# Patient Record
Sex: MALE | Race: BLACK OR AFRICAN AMERICAN | Employment: STUDENT | URBAN - METROPOLITAN AREA
[De-identification: names, ages, dates, MRNs, and addresses within clinical notes are randomized per-mention and may not be internally consistent; named-entity substitution may affect disease eponyms.]

---
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
## 2024-06-14 NOTE — ED INITIAL ASSESSMENT (HPI)
Head congestion, post nasal drip, productive cough.  Felt like he had fever & chills.  Woke w eyes crusted shut w discharge.  Also rash on & off.  Onset 72 hours.

## 2024-06-14 NOTE — ED PROVIDER NOTES
Patient Seen in: [facility]      History     Chief Complaint   Patient presents with    Eye Visual Problem     Stated Complaint: red eye    Subjective:   HPI  Paul Thomas is a 18 year old male presents with acute onset of URI symptoms x 2 days. Patient reports  bilateral eye redness/ irritation, sinus congestion, non productive cough, rhinorrhea.  Patient denies dysphagia, throat pain, ear pain,  fevers, chills, shortness of breath, respiratory distress, stridor, neck pain/ stiffness, headache, eye pain,facial/ lip/ eyelid swelling. No medications taken prior to arrival. No alleviating/ aggravating factors. Patient is  concerned about COVID 19 infection at this encounter.  Patient is  immunized for COVID 19.             Objective:   History reviewed. No pertinent past medical history.           History reviewed. No pertinent surgical history.             Social History     Socioeconomic History    Marital status: Single   Tobacco Use    Smoking status: Never     Passive exposure: Never    Smokeless tobacco: Never   Vaping Use    Vaping status: Never Used   Substance and Sexual Activity    Alcohol use: Never    Drug use: Never     Social Determinants of Health      Received from [facility]    Housing Stability              Review of Systems   All other systems reviewed and are negative.      Positive for stated complaint: red eye  Other systems are as noted in HPI.  Constitutional and vital signs reviewed.      All other systems reviewed and negative except as noted above.    Physical Exam     ED Triage Vitals [06/14/24 0814]   /66   Pulse 75   Resp 18   Temp 99 °F (37.2 °C)   Temp src Temporal   SpO2    O2 Device        Current Vitals:   Vital Signs  BP: 119/66  Pulse: 75  Resp: 18  Temp: 99 °F (37.2 °C)  Temp src: Temporal            Physical Exam  Vitals and nursing note reviewed.   Constitutional:       General: He is not in acute distress.     Appearance:  Normal appearance. He is normal weight. He is not ill-appearing, toxic-appearing or diaphoretic.   HENT:      Head: Normocephalic and atraumatic.      Right Ear: Tympanic membrane and ear canal normal.      Left Ear: Tympanic membrane and ear canal normal.      Nose: Congestion present. No rhinorrhea.      Mouth/Throat:      Mouth: Mucous membranes are moist.      Pharynx: Oropharynx is clear. No oropharyngeal exudate or posterior oropharyngeal erythema.   Eyes:      General:         Right eye: No discharge.         Left eye: No discharge.      Extraocular Movements: Extraocular movements intact.      Pupils: Pupils are equal, round, and reactive to light.      Comments: Bilateral conjunctival injection      Cardiovascular:      Rate and Rhythm: Normal rate.      Pulses: Normal pulses.   Pulmonary:      Effort: Pulmonary effort is normal. No respiratory distress.      Breath sounds: Normal breath sounds. No stridor. No wheezing, rhonchi or rales.   Chest:      Chest wall: No tenderness.   Musculoskeletal:         General: No swelling, tenderness, deformity or signs of injury. Normal range of motion.      Cervical back: Normal range of motion and neck supple.      Right lower leg: No edema.      Left lower leg: No edema.   Skin:     General: Skin is warm and dry.      Capillary Refill: Capillary refill takes less than 2 seconds.      Coloration: Skin is not jaundiced or pale.      Findings: No bruising, erythema, lesion or rash.   Neurological:      General: No focal deficit present.      Mental Status: He is alert and oriented to person, place, and time. Mental status is at baseline.   Psychiatric:         Mood and Affect: Mood normal.         Behavior: Behavior normal.         Thought Content: Thought content normal.         Judgment: Judgment normal.               ED Course   Labs Reviewed - No data to display                   MDM                                        Medical Decision Making  18 year old well  appearing male presents with acute onset of rhinorrhea, nasal congestion, and bilateral eye redness that started less than 2 days prior to arrival.  Considerations to include but not limited to bacterial conjunctivitis vs allergic/ viral conjunctivitis vs corneal abrasion vs iritis vs foreign body     Plan  - No acute interventions at this encounter   - DC to home   - rx: Flonase 2 sprays to bilateral nostrils BID. Zyrtec po daily.  ofloxacin eye drops to bilateral eyes TID (to be started if symptoms do not improve or worsen within the next 24-48 hours).   - refer to PCP for further evaluation    - return to ED         Disposition and Plan     Clinical Impression:  1. Allergic conjunctivitis of both eyes         Disposition:  Discharge  6/14/2024  8:24 am    Follow-up:  [facility] [place]  [address] [id number]        [address] [address] [phone]              Medications Prescribed:  Discharge Medication List as of 6/14/2024  8:26 AM        START taking these medications    Details   cetirizine 10 MG Oral Tab Take 1 tablet (10 mg total) by mouth daily., Normal, Disp-30 tablet, R-0      fluticasone propionate 50 MCG/ACT Nasal Suspension 2 sprays by Nasal route daily., Normal, Disp-16 g, R-0      ofloxacin 0.3 % Ophthalmic Solution Place 2 drops into both eyes 4 (four) times daily., Print, Disp-10 mL, R-0

## 2024-07-23 NOTE — DISCHARGE INSTRUCTIONS
[place] diet.  For the Zofran as needed for nausea.  Bentyl as needed for abdominal cramping.  Push fluids with electrolytes.  If diarrhea worsens or fails to resolve perform outpatient stool study

## 2024-07-23 NOTE — ED PROVIDER NOTES
Patient Seen in: [facility] [place]      History     Chief Complaint   Patient presents with    Nausea/Vomiting/Diarrhea     Stated Complaint: vomiting    Subjective:   HPI    18-year-old male.  Patient awoke at 8 AM this morning with new onset nausea, vomiting and diarrhea.  Estimates 3 episodes each.  Generalized abdominal cramping.  Tactile fever/chills.  No concurrent cough or cold symptoms.  No recent travel or antibiotics.  No recent sick contacts.  No sore throat.    Objective:   History reviewed. No pertinent past medical history.           History reviewed. No pertinent surgical history.             Social History     Socioeconomic History    Marital status: Single   Tobacco Use    Smoking status: Never     Passive exposure: Never    Smokeless tobacco: Never   Vaping Use    Vaping status: Never Used   Substance and Sexual Activity    Alcohol use: Never    Drug use: Never     Social Determinants of Health      Received from [facility]    Housing Stability              Review of Systems    Positive for stated Chief Complaint: Nausea/Vomiting/Diarrhea    Other systems are as noted in HPI.  Constitutional and vital signs reviewed.      All other systems reviewed and negative except as noted above.    Physical Exam     ED Triage Vitals [07/22/24 2115]   /68   Pulse 88   Resp 18   Temp 99.4 °F (37.4 °C)   Temp src Oral   SpO2 98 %   O2 Device None (Room air)       Current Vitals:   Vital Signs  BP: 103/68  Pulse: 88  Resp: 18  Temp: 99.4 °F (37.4 °C)  Temp src: Oral    Oxygen Therapy  SpO2: 98 %  O2 Device: None (Room air)            Physical Exam    Gen: Well appearing, well groomed, alert and aware x 3  Neck: Supple, full range of motion, no thyromegaly or lymphadenopathy.  Abdominal: Low-grade epigastric pain to palpation.  Lower quadrants benign  Back: Full active range of motion.  No CVA tenderness bilaterally.  Lung: No distress, RR, no retraction, breath  sounds are clear bilaterally  Cardio: Regular rate and rhythm, normal S1-S2, no murmur appreciable    ED Course     Labs Reviewed   COMP METABOLIC PANEL (14) - Abnormal; Notable for the following components:       Result Value    Sodium 134 (*)     ALT 74 (*)     All other components within normal limits   CBC W/ DIFFERENTIAL - Abnormal; Notable for the following components:    Lymphocyte Absolute 0.38 (*)     All other components within normal limits   LIPASE - Normal   CBC WITH DIFFERENTIAL WITH PLATELET    Narrative:     The following orders were created for panel order CBC With Differential With Platelet.  Procedure                               Abnormality         Status                     ---------                               -----------         ------                     CBC W/ DIFFERENTIAL[[id number]]          Abnormal            Final result                 Please view results for these tests on the individual orders.                      MDM                Temperature 99.4.  Pulse of 88.  Low-grade epigastric pain to palpation.  Lower quadrants benign.  No blood or mucus in stool.  No coffee-ground emesis      IV placed.  Fluid bolus.  CBC, CMP and lipase.  Zofran, Bentyl and Toradol administered          CBC demonstrates no leukocytosis.    Lipase within normal limits      CMP demonstrates a sodium 134 and an ALT of 74.  Otherwise negative    P.o. challenge performed.  Able to drink    Patient reevaluated.  Feels much improved.  No episodes of vomiting or diarrhea while in the ER.  Likely viral gastroenteritis.  We discussed.  Sent home with further Bentyl and Zofran.  Push fluids with electrolytes.         Medical Decision Making      Disposition and Plan     Clinical Impression:  1. Gastroenteritis         Disposition:  There is no disposition on file for this visit.  There is no disposition time on file for this visit.    Follow-up:  Ariel Leiva MD  [address] [facility] [address] [place] [address]  [id number]  [phone]    Follow up            Medications Prescribed:  Current Discharge Medication List        START taking these medications    Details   ondansetron 4 MG Oral Tablet Dispersible Take 1 tablet (4 mg total) by mouth every 4 (four) hours as needed for Nausea.  Qty: 10 tablet, Refills: 0      dicyclomine 20 MG Oral Tab Take 1 tablet (20 mg total) by mouth 4 (four) times daily as needed.  Qty: 30 tablet, Refills: 0

## (undated) NOTE — ED AVS SNAPSHOT
Parent/Legal Guardian Access to the Online [employer] Record of a Patient 15to 16Years Old  Return completed form by Secure email to [facility] HIM/Medical Records Department: [email or web address].     Requirements and Procedures   Under [facility] MyChart ID and password with another person, that person may be able to view my or my child’s health information, and health information about someone who has authorized me as a MyChart proxy.    ·  I agree that it is my responsibility to select a confident Sign-Up Form and I agree to its terms.        Authorization Form     Please enter Patient’s information below:   Name (last, first, middle initial) __________________________________________   Gender  Male  Female    Last 4 Digits of Social Security Number Parent/Legal Guardian Signature                                  For Patient (1517 years of age)  I agree to allow my parent/legal guardian, named above, online access to my medical information currently available and that may become available as a result